# Patient Record
(demographics unavailable — no encounter records)

---

## 2024-10-14 NOTE — REASON FOR VISIT
[Consultation] : a consultation visit [Thyroid nodule/ MNG] : thyroid nodule/ MNG [Spouse] : spouse [FreeTextEntry2] : Dr. Kilgore

## 2024-10-14 NOTE — HISTORY OF PRESENT ILLNESS
[FreeTextEntry1] : Ms Grimm was a patient her many years ago followed for a thyroid nodule with sonograms.  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  Score was 4 9 84 percentile she was started on a statin but has refused to take it at this time.  Because of the abnormal calcium score she feels she has hypercalcemia although her calcium level was 10 and a correction for albumin is 9.54 which I explained is normal.  She is still not convinced.  She never had kidney stones but was told she has a stone in her kidney that is Non obstructing and she was also told she has osteoporosis but never treated.

## 2024-10-14 NOTE — PHYSICAL EXAM
[Alert] : alert [Well Nourished] : well nourished [No Acute Distress] : no acute distress [Well Developed] : well developed [Normal Sclera/Conjunctiva] : normal sclera/conjunctiva [EOMI] : extra ocular movement intact [No Proptosis] : no proptosis [Normal Oropharynx] : the oropharynx was normal [Thyroid Not Enlarged] : the thyroid was not enlarged [No Thyroid Nodules] : no palpable thyroid nodules [No Respiratory Distress] : no respiratory distress [No Accessory Muscle Use] : no accessory muscle use [Clear to Auscultation] : lungs were clear to auscultation bilaterally [Normal S1, S2] : normal S1 and S2 [Normal Rate] : heart rate was normal [Regular Rhythm] : with a regular rhythm [No Edema] : no peripheral edema [Pedal Pulses Normal] : the pedal pulses are present [Normal Bowel Sounds] : normal bowel sounds [Not Tender] : non-tender [Not Distended] : not distended [Soft] : abdomen soft [Normal Anterior Cervical Nodes] : no anterior cervical lymphadenopathy [No Spinal Tenderness] : no spinal tenderness [Spine Straight] : spine straight [No Stigmata of Cushings Syndrome] : no stigmata of Cushings Syndrome [Normal Gait] : normal gait [Normal Strength/Tone] : muscle strength and tone were normal [No Rash] : no rash [Normal Reflexes] : deep tendon reflexes were 2+ and symmetric [No Tremors] : no tremors [Oriented x3] : oriented to person, place, and time [Acanthosis Nigricans] : no acanthosis nigricans [de-identified] : Right thyroid > left  non tender

## 2024-10-14 NOTE — PAST MEDICAL HISTORY
[Menarche Age ____] : age at menarche was [unfilled] [Menopause Age____] : age at menopause was [unfilled] [Regular Cycle Intervals] : have been regular [Total Preg ___] : G[unfilled] [Live Births ___] : P[unfilled]  [Full Term ___] : Full Term: [unfilled]

## 2024-10-14 NOTE — REVIEW OF SYSTEMS
[Fatigue] : fatigue [Recent Weight Gain (___ Lbs)] : recent weight gain: [unfilled] lbs [Shortness Of Breath] : shortness of breath [Cough] : cough [Nausea] : nausea [Heartburn] : heartburn [Nocturia] : nocturia [Joint Pain] : joint pain [Headaches] : headaches [Pain/Numbness of Digits] : pain/numbness of digits [Anxiety] : anxiety [As Noted in HPI] : as noted in HPI [Negative] : Heme/Lymph [All other systems negative] : All other systems negative [Neck Pain] : no neck pain [FreeTextEntry3] : reading glasses [FreeTextEntry4] : h/o sinus problems [FreeTextEntry5] : some heaviness in chest  [FreeTextEntry6] : Breo helps [FreeTextEntry7] : pepcid [de-identified] : folliculitis scalp uses lotion  [de-identified] : hands

## 2024-10-14 NOTE — CONSULT LETTER
[Dear  ___] : Dear  [unfilled], [( Thank you for referring [unfilled] for consultation for _____ )] : Thank you for referring [unfilled] for consultation for [unfilled] [Please see my note below.] : Please see my note below. [Consult Closing:] : Thank you very much for allowing me to participate in the care of this patient.  If you have any questions, please do not hesitate to contact me. [Sincerely,] : Sincerely, [FreeTextEntry3] : Pooja Rodriguez MD\par

## 2024-10-14 NOTE — ASSESSMENT
[FreeTextEntry1] : Patient with a history of a multinodular goiter.  Recent sonogram October 2023 shows 3 sub centimeter thyroid nodules.  Largest on right is 9 mm.  She does complain of intermittent hoarseness and some pressure feelings over her neck especially when wearing turtlenecks.  She recently had a CT of her chest, and thyroid nodules was seen so she was referred back here.  Since overall her thyroid nodules are small and stable but she is still symptomatic I have recommended that she consult with a head and neck surgeon especially since she does experience some hoarseness.  She has never been on thyroid medication.  Recent thyroid hormone levels and antibodies were negative.  Will do a follow-up thyroid ultrasound. I explained her calcium level is normal but because she has a kidney stone we will check a PTH level and do a 24-hour urine for calcium she will also do a follow-up bone density. Last visit workup for hair loss she has a borderline LIOR of 1:80 and said she does have a complaint of muscle pains we will consider seeing a rheumatologist.

## 2025-04-29 NOTE — REVIEW OF SYSTEMS
[Fatigue] : fatigue [Recent Weight Gain (___ Lbs)] : recent weight gain: [unfilled] lbs [Shortness Of Breath] : shortness of breath [Cough] : cough [Nausea] : nausea [Heartburn] : heartburn [Nocturia] : nocturia [Joint Pain] : joint pain [Headaches] : headaches [Pain/Numbness of Digits] : pain/numbness of digits [Anxiety] : anxiety [As Noted in HPI] : as noted in HPI [Negative] : Heme/Lymph [All other systems negative] : All other systems negative [Neck Pain] : no neck pain [FreeTextEntry3] : reading glasses [FreeTextEntry4] : h/o sinus problems [FreeTextEntry5] : some heaviness in chest  [FreeTextEntry6] : Breo helps [FreeTextEntry7] : pepcid [de-identified] : folliculitis scalp uses lotion  [de-identified] : hands

## 2025-04-29 NOTE — ASSESSMENT
[FreeTextEntry1] : Patient with a history of a multinodular goiter.  Recent sonogram October 2023 shows 3 sub centimeter thyroid nodules.  Largest on right is 9 mm.  She does complain of intermittent hoarseness and some pressure feelings over her neck especially when wearing turtlenecks.  She recently had a CT of her chest, and thyroid nodules was seen so she was referred back here.  Since overall her thyroid nodules are small and stable but she is still symptomatic I have recommended that she consult with a head and neck surgeon especially since she does experience some hoarseness.  She has never been on thyroid medication.  Recent thyroid hormone levels and antibodies were negative.  Will do a follow-up thyroid ultrasound February 2026.. I explained her calcium level is normal but because she has a kidney stone we will check a PTH level and 24-hour urine for calcium  was normal. she will also do a follow-up bone density. Last visit workup for hair loss she has a borderline LIOR of 1:80 and said she does have a complaint of muscle pains we will consider seeing a rheumatologist.

## 2025-04-29 NOTE — PHYSICAL EXAM
[Alert] : alert [Well Nourished] : well nourished [No Acute Distress] : no acute distress [Well Developed] : well developed [Normal Sclera/Conjunctiva] : normal sclera/conjunctiva [EOMI] : extra ocular movement intact [No Proptosis] : no proptosis [Normal Oropharynx] : the oropharynx was normal [Thyroid Not Enlarged] : the thyroid was not enlarged [No Thyroid Nodules] : no palpable thyroid nodules [No Respiratory Distress] : no respiratory distress [No Accessory Muscle Use] : no accessory muscle use [Clear to Auscultation] : lungs were clear to auscultation bilaterally [Normal S1, S2] : normal S1 and S2 [Normal Rate] : heart rate was normal [Regular Rhythm] : with a regular rhythm [No Edema] : no peripheral edema [Pedal Pulses Normal] : the pedal pulses are present [Normal Bowel Sounds] : normal bowel sounds [Not Tender] : non-tender [Not Distended] : not distended [Soft] : abdomen soft [Normal Anterior Cervical Nodes] : no anterior cervical lymphadenopathy [No Spinal Tenderness] : no spinal tenderness [Spine Straight] : spine straight [No Stigmata of Cushings Syndrome] : no stigmata of Cushings Syndrome [Normal Gait] : normal gait [Normal Strength/Tone] : muscle strength and tone were normal [No Rash] : no rash [Normal Reflexes] : deep tendon reflexes were 2+ and symmetric [No Tremors] : no tremors [Oriented x3] : oriented to person, place, and time [Acanthosis Nigricans] : no acanthosis nigricans [de-identified] : Right thyroid > left  non tender